# Patient Record
Sex: FEMALE | Race: BLACK OR AFRICAN AMERICAN | NOT HISPANIC OR LATINO | Employment: FULL TIME | URBAN - METROPOLITAN AREA
[De-identification: names, ages, dates, MRNs, and addresses within clinical notes are randomized per-mention and may not be internally consistent; named-entity substitution may affect disease eponyms.]

---

## 2020-08-19 PROCEDURE — 99284 EMERGENCY DEPT VISIT MOD MDM: CPT

## 2020-08-20 ENCOUNTER — APPOINTMENT (OUTPATIENT)
Dept: ULTRASOUND IMAGING | Facility: HOSPITAL | Age: 25
End: 2020-08-20

## 2020-08-20 ENCOUNTER — HOSPITAL ENCOUNTER (EMERGENCY)
Facility: HOSPITAL | Age: 25
Discharge: HOME | End: 2020-08-20
Attending: EMERGENCY MEDICINE | Admitting: EMERGENCY MEDICINE

## 2020-08-20 VITALS
RESPIRATION RATE: 16 BRPM | HEIGHT: 62 IN | OXYGEN SATURATION: 99 % | TEMPERATURE: 98.9 F | HEART RATE: 64 BPM | SYSTOLIC BLOOD PRESSURE: 102 MMHG | DIASTOLIC BLOOD PRESSURE: 68 MMHG

## 2020-08-20 DIAGNOSIS — O20.0 THREATENED ABORTION IN EARLY PREGNANCY: ICD-10-CM

## 2020-08-20 DIAGNOSIS — N93.9 VAGINAL BLEEDING: Primary | ICD-10-CM

## 2020-08-20 LAB
ABO GROUP BLD: NORMAL
ALBUMIN SERPL-MCNC: 3.8 G/DL (ref 3.5–5.2)
ALBUMIN/GLOB SERPL: 1.2 G/DL
ALP SERPL-CCNC: 66 U/L (ref 39–117)
ALT SERPL W P-5'-P-CCNC: 30 U/L (ref 1–33)
ANION GAP SERPL CALCULATED.3IONS-SCNC: 5.7 MMOL/L (ref 5–15)
AST SERPL-CCNC: 24 U/L (ref 1–32)
BACTERIA UR QL AUTO: NORMAL /HPF
BASOPHILS # BLD AUTO: 0.01 10*3/MM3 (ref 0–0.2)
BASOPHILS NFR BLD AUTO: 0.2 % (ref 0–1.5)
BILIRUB SERPL-MCNC: 0.3 MG/DL (ref 0–1.2)
BILIRUB UR QL STRIP: NEGATIVE
BUN SERPL-MCNC: 9 MG/DL (ref 6–20)
BUN/CREAT SERPL: 14.5 (ref 7–25)
CALCIUM SPEC-SCNC: 8.7 MG/DL (ref 8.6–10.5)
CHLORIDE SERPL-SCNC: 106 MMOL/L (ref 98–107)
CLARITY UR: CLEAR
CO2 SERPL-SCNC: 25.3 MMOL/L (ref 22–29)
COLOR UR: YELLOW
CREAT SERPL-MCNC: 0.62 MG/DL (ref 0.57–1)
DEPRECATED RDW RBC AUTO: 44.5 FL (ref 37–54)
EOSINOPHIL # BLD AUTO: 0.17 10*3/MM3 (ref 0–0.4)
EOSINOPHIL NFR BLD AUTO: 3.4 % (ref 0.3–6.2)
ERYTHROCYTE [DISTWIDTH] IN BLOOD BY AUTOMATED COUNT: 14.7 % (ref 12.3–15.4)
GFR SERPL CREATININE-BSD FRML MDRD: 143 ML/MIN/1.73
GLOBULIN UR ELPH-MCNC: 3.1 GM/DL
GLUCOSE SERPL-MCNC: 93 MG/DL (ref 65–99)
GLUCOSE UR STRIP-MCNC: NEGATIVE MG/DL
HCG INTACT+B SERPL-ACNC: 6.37 MIU/ML
HCG SERPL QL: POSITIVE
HCT VFR BLD AUTO: 37.6 % (ref 34–46.6)
HGB BLD-MCNC: 12.5 G/DL (ref 12–15.9)
HGB UR QL STRIP.AUTO: ABNORMAL
HYALINE CASTS UR QL AUTO: NORMAL /LPF
IMM GRANULOCYTES # BLD AUTO: 0.01 10*3/MM3 (ref 0–0.05)
IMM GRANULOCYTES NFR BLD AUTO: 0.2 % (ref 0–0.5)
KETONES UR QL STRIP: NEGATIVE
LEUKOCYTE ESTERASE UR QL STRIP.AUTO: NEGATIVE
LYMPHOCYTES # BLD AUTO: 1.93 10*3/MM3 (ref 0.7–3.1)
LYMPHOCYTES NFR BLD AUTO: 39 % (ref 19.6–45.3)
MCH RBC QN AUTO: 27.7 PG (ref 26.6–33)
MCHC RBC AUTO-ENTMCNC: 33.2 G/DL (ref 31.5–35.7)
MCV RBC AUTO: 83.2 FL (ref 79–97)
MONOCYTES # BLD AUTO: 0.28 10*3/MM3 (ref 0.1–0.9)
MONOCYTES NFR BLD AUTO: 5.7 % (ref 5–12)
NEUTROPHILS NFR BLD AUTO: 2.55 10*3/MM3 (ref 1.7–7)
NEUTROPHILS NFR BLD AUTO: 51.5 % (ref 42.7–76)
NITRITE UR QL STRIP: NEGATIVE
NRBC BLD AUTO-RTO: 0 /100 WBC (ref 0–0.2)
PH UR STRIP.AUTO: >=9 [PH] (ref 5–8)
PLATELET # BLD AUTO: 216 10*3/MM3 (ref 140–450)
PMV BLD AUTO: 11.4 FL (ref 6–12)
POTASSIUM SERPL-SCNC: 3.4 MMOL/L (ref 3.5–5.2)
PROT SERPL-MCNC: 6.9 G/DL (ref 6–8.5)
PROT UR QL STRIP: NEGATIVE
RBC # BLD AUTO: 4.52 10*6/MM3 (ref 3.77–5.28)
RBC # UR: NORMAL /HPF
REF LAB TEST METHOD: NORMAL
RH BLD: POSITIVE
SODIUM SERPL-SCNC: 137 MMOL/L (ref 136–145)
SP GR UR STRIP: 1.02 (ref 1–1.03)
SQUAMOUS #/AREA URNS HPF: NORMAL /HPF
UROBILINOGEN UR QL STRIP: ABNORMAL
WBC # BLD AUTO: 4.95 10*3/MM3 (ref 3.4–10.8)
WBC UR QL AUTO: NORMAL /HPF

## 2020-08-20 PROCEDURE — 76815 OB US LIMITED FETUS(S): CPT

## 2020-08-20 PROCEDURE — 93976 VASCULAR STUDY: CPT

## 2020-08-20 PROCEDURE — 86900 BLOOD TYPING SEROLOGIC ABO: CPT | Performed by: PHYSICIAN ASSISTANT

## 2020-08-20 PROCEDURE — 85025 COMPLETE CBC W/AUTO DIFF WBC: CPT | Performed by: EMERGENCY MEDICINE

## 2020-08-20 PROCEDURE — 84702 CHORIONIC GONADOTROPIN TEST: CPT | Performed by: PHYSICIAN ASSISTANT

## 2020-08-20 PROCEDURE — 86901 BLOOD TYPING SEROLOGIC RH(D): CPT | Performed by: PHYSICIAN ASSISTANT

## 2020-08-20 PROCEDURE — 80053 COMPREHEN METABOLIC PANEL: CPT | Performed by: EMERGENCY MEDICINE

## 2020-08-20 PROCEDURE — 81001 URINALYSIS AUTO W/SCOPE: CPT | Performed by: EMERGENCY MEDICINE

## 2020-08-20 PROCEDURE — 76817 TRANSVAGINAL US OBSTETRIC: CPT

## 2020-08-20 PROCEDURE — 84703 CHORIONIC GONADOTROPIN ASSAY: CPT | Performed by: EMERGENCY MEDICINE

## 2020-08-20 RX ORDER — ACETAMINOPHEN 500 MG
1000 TABLET ORAL ONCE
Status: COMPLETED | OUTPATIENT
Start: 2020-08-20 | End: 2020-08-20

## 2020-08-20 RX ORDER — DOXYCYCLINE HYCLATE 50 MG/1
324 CAPSULE, GELATIN COATED ORAL 3 TIMES DAILY
COMMUNITY
Start: 2015-01-09

## 2020-08-20 RX ADMIN — ACETAMINOPHEN 1000 MG: 500 TABLET, FILM COATED ORAL at 03:01

## 2020-08-20 NOTE — ED PROVIDER NOTES
EMERGENCY DEPARTMENT ENCOUNTER    Room Number:  06/06  Date of encounter:  8/20/2020  PCP: Provider, No Known  Historian: Patient      HPI:  Chief Complaint: Vaginal bleeding  A complete HPI/ROS/PMH/PSH/SH/FH are unobtainable due to: Nothing    Context: Freda Cantrell is a 25 y.o. female who presents to the ED c/o vaginal bleeding that began earlier today.  Patient states it is more of a spotting like with her menstrual cycle and she was due to start today however she recently had 3 home positive pregnancy test and is concerned that she may be pregnant and bleeding.  She denies associated fever, chills, nausea, vomiting, recent sick contacts at this time.  She also denies abdominal pain, abnormal bowel movements.  She states there is some lower care being in her back that she describes as mild and controlled with Tylenol.  She states the cramping is nonradiating and tends to be worse with movement.  Patient is unsure of her blood type.  There are no other issues to report at this time.      PAST MEDICAL HISTORY  Active Ambulatory Problems     Diagnosis Date Noted   • No Active Ambulatory Problems     Resolved Ambulatory Problems     Diagnosis Date Noted   • No Resolved Ambulatory Problems     Past Medical History:   Diagnosis Date   • Anemia          PAST SURGICAL HISTORY  History reviewed. No pertinent surgical history.      FAMILY HISTORY  History reviewed. No pertinent family history.      SOCIAL HISTORY  Social History     Socioeconomic History   • Marital status: Single     Spouse name: Not on file   • Number of children: Not on file   • Years of education: Not on file   • Highest education level: Not on file   Tobacco Use   • Smoking status: Never Smoker   • Smokeless tobacco: Never Used   Substance and Sexual Activity   • Alcohol use: Not Currently   • Drug use: Not Currently   • Sexual activity: Yes     Partners: Male     Birth control/protection: None         ALLERGIES  Patient has no known  allergies.        REVIEW OF SYSTEMS  Review of Systems   Constitutional: Negative for chills.   HENT: Negative.    Respiratory: Negative.    Cardiovascular: Negative.    Gastrointestinal: Negative.    Genitourinary: Positive for vaginal bleeding. Negative for flank pain.   Musculoskeletal: Positive for back pain.   Skin: Negative.    Neurological: Negative.  Weakness:    Hematological: Does not bruise/bleed easily.   Psychiatric/Behavioral: Negative.         All systems reviewed and negative except for those discussed in HPI.       PHYSICAL EXAM    I have reviewed the triage vital signs and nursing notes.    ED Triage Vitals [08/19/20 2249]   Temp Heart Rate Resp BP SpO2   98.9 °F (37.2 °C) 88 16 108/71 100 %      Temp src Heart Rate Source Patient Position BP Location FiO2 (%)   Tympanic Monitor Sitting Left arm --       Physical Exam  GENERAL: WDWN female no acute distress  HENT: nares patent NCAT  EYES: no scleral icterus  CV: regular rhythm, regular rate, heart sounds normal, no pedal edema  RESPIRATORY: normal effort no adventitious lung sounds  ABDOMEN: soft, nontender  MUSCULOSKELETAL: no deformity  NEURO: alert, moves all extremities, follows commands, no focal neuro deficits  SKIN: warm, dry no obvious skin rash to the areas of concern        LAB RESULTS  Recent Results (from the past 24 hour(s))   Comprehensive Metabolic Panel    Collection Time: 08/20/20  1:59 AM   Result Value Ref Range    Glucose 93 65 - 99 mg/dL    BUN 9 6 - 20 mg/dL    Creatinine 0.62 0.57 - 1.00 mg/dL    Sodium 137 136 - 145 mmol/L    Potassium 3.4 (L) 3.5 - 5.2 mmol/L    Chloride 106 98 - 107 mmol/L    CO2 25.3 22.0 - 29.0 mmol/L    Calcium 8.7 8.6 - 10.5 mg/dL    Total Protein 6.9 6.0 - 8.5 g/dL    Albumin 3.80 3.50 - 5.20 g/dL    ALT (SGPT) 30 1 - 33 U/L    AST (SGOT) 24 1 - 32 U/L    Alkaline Phosphatase 66 39 - 117 U/L    Total Bilirubin 0.3 0.0 - 1.2 mg/dL    eGFR  African Amer 143 >60 mL/min/1.73    Globulin 3.1 gm/dL    A/G  Ratio 1.2 g/dL    BUN/Creatinine Ratio 14.5 7.0 - 25.0    Anion Gap 5.7 5.0 - 15.0 mmol/L   hCG, Serum, Qualitative    Collection Time: 08/20/20  1:59 AM   Result Value Ref Range    HCG Qualitative Positive (A) Negative   CBC Auto Differential    Collection Time: 08/20/20  1:59 AM   Result Value Ref Range    WBC 4.95 3.40 - 10.80 10*3/mm3    RBC 4.52 3.77 - 5.28 10*6/mm3    Hemoglobin 12.5 12.0 - 15.9 g/dL    Hematocrit 37.6 34.0 - 46.6 %    MCV 83.2 79.0 - 97.0 fL    MCH 27.7 26.6 - 33.0 pg    MCHC 33.2 31.5 - 35.7 g/dL    RDW 14.7 12.3 - 15.4 %    RDW-SD 44.5 37.0 - 54.0 fl    MPV 11.4 6.0 - 12.0 fL    Platelets 216 140 - 450 10*3/mm3    Neutrophil % 51.5 42.7 - 76.0 %    Lymphocyte % 39.0 19.6 - 45.3 %    Monocyte % 5.7 5.0 - 12.0 %    Eosinophil % 3.4 0.3 - 6.2 %    Basophil % 0.2 0.0 - 1.5 %    Immature Grans % 0.2 0.0 - 0.5 %    Neutrophils, Absolute 2.55 1.70 - 7.00 10*3/mm3    Lymphocytes, Absolute 1.93 0.70 - 3.10 10*3/mm3    Monocytes, Absolute 0.28 0.10 - 0.90 10*3/mm3    Eosinophils, Absolute 0.17 0.00 - 0.40 10*3/mm3    Basophils, Absolute 0.01 0.00 - 0.20 10*3/mm3    Immature Grans, Absolute 0.01 0.00 - 0.05 10*3/mm3    nRBC 0.0 0.0 - 0.2 /100 WBC   Urinalysis With Microscopic If Indicated (No Culture) - Urine, Clean Catch    Collection Time: 08/20/20  1:59 AM   Result Value Ref Range    Color, UA Yellow Yellow, Straw    Appearance, UA Clear Clear    pH, UA >=9.0 (H) 5.0 - 8.0    Specific Gravity, UA 1.018 1.005 - 1.030    Glucose, UA Negative Negative    Ketones, UA Negative Negative    Bilirubin, UA Negative Negative    Blood, UA Moderate (2+) (A) Negative    Protein, UA Negative Negative    Leuk Esterase, UA Negative Negative    Nitrite, UA Negative Negative    Urobilinogen, UA 1.0 E.U./dL 0.2 - 1.0 E.U./dL   Urinalysis, Microscopic Only - Urine, Clean Catch    Collection Time: 08/20/20  1:59 AM   Result Value Ref Range    RBC, UA 0-2 None Seen, 0-2 /HPF    WBC, UA 0-2 None Seen, 0-2 /HPF     Bacteria, UA None Seen None Seen /HPF    Squamous Epithelial Cells, UA 0-2 None Seen, 0-2 /HPF    Hyaline Casts, UA None Seen None Seen /LPF    Methodology Automated Microscopy    hCG, Quantitative, Pregnancy    Collection Time: 08/20/20  3:01 AM   Result Value Ref Range    HCG Quantitative 6.37 mIU/mL   ABO / Rh    Collection Time: 08/20/20  3:01 AM   Result Value Ref Range    ABO Type A     RH type Positive        Ordered the above labs and independently reviewed the results.        RADIOLOGY  No Radiology Exams Resulted Within Past 24 Hours    I ordered the above noted radiological studies. Reviewed by me and discussed with radiologist.  See dictation for official radiology interpretation.      PROCEDURES    Procedures      MEDICATIONS GIVEN IN ER    Medications   acetaminophen (TYLENOL) tablet 1,000 mg (1,000 mg Oral Given 8/20/20 0301)         PROGRESS, DATA ANALYSIS, CONSULTS, AND MEDICAL DECISION MAKING    All labs have been independently reviewed by me.  All radiology studies have been reviewed by me and discussed with radiologist dictating the report.   EKG's independently viewed and interpreted by me.  Discussion below represents my analysis of pertinent findings related to patient's condition, differential diagnosis, treatment plan and final disposition.      Suspect likely miscarriage given history and work-up.  However, cannot rule out an extremely early pregnancy.  We will have the patient follow-up with OB/GYN next 48 hours or as soon as possible to get a repeat quantitative hCG and further evaluation.  Have her return to the emergency department with any worsening of her vaginal bleeding.  Patient appears very stable, nontoxic, and in no pain at this time.  Will DC home at this time.      ED Course as of Aug 20 0443   Thu Aug 20, 2020   0341 HCG Quantitative: 6.37 [RC]   0342 HCG Qualitative(!): Positive [RC]   0342 WBC: 4.95 [RC]   0342 Hemoglobin: 12.5 [RC]   0342 RBC: 4.52 [RC]   0342 Hematocrit:  37.6 [RC]   0342 Platelets: 216 [RC]   0342 Glucose: 93 [RC]   0342 Creatinine: 0.62 [RC]   0342 BUN: 9 [RC]   0342 Potassium(!): 3.4 [RC]   0342 Anion Gap: 5.7 [RC]   0403 ABO Type: A [RC]   0403 RH type: Positive [RC]   0403 HCG Quantitative: 6.37 [RC]      ED Course User Index  [RC] Carlos Bustamante III, PA       Patient was placed in face mask in first look. Patient was wearing facemask when I entered the room and throughout our encounter. I wore full protective equipment throughout this patient encounter including a face mask, and gloves. Hand hygiene was performed before donning protective equipment and after removal when leaving the room.    AS OF 04:43 VITALS:    BP - 109/63  HR - 88  TEMP - 98.9 °F (37.2 °C) (Tympanic)  O2 SATS - 100%        DIAGNOSIS  Final diagnoses:   Vaginal bleeding   Threatened  in early pregnancy         DISPOSITION  DISCHARGE    Patient discharged in stable condition.    Reviewed implications of results, diagnosis, meds, responsibility to follow up, warning signs and symptoms of possible worsening, potential complications and reasons to return to ER.    Patient/Family voiced understanding of above instructions.    Discussed plan for discharge, as there is no emergent indication for admission. Patient referred to primary care provider for BP management due to today's BP. Pt/family is agreeable and understands need for follow up and repeat testing.  Pt is aware that discharge does not mean that nothing is wrong but it indicates no emergency is present that requires admission and they must continue care with follow-up as given below or physician of their choice.     FOLLOW-UP  Ester Sanders MD  [address] [place] [address] [address]  [place] [address]  [phone]    Schedule an appointment as soon as possible for a visit   For further evaluation and treatment         Medication List      No changes were made to your prescriptions during this visit.                   Carlos Bustamante III, PA  08/20/20 0445

## 2020-08-20 NOTE — ED PROVIDER NOTES
The CINTIA and I have discussed this patients history, physical exam, and treatment plan. I have reviewed the documentation and personally had a face to face interaction with the patient. I affirm the documentation and agree with the treatment and plan.  The following note describes my personal findings    This patient is a 25-year-old female presenting to the emergency room [facility] with vaginal bleeding.  The patient states that this is approximately around the time where her period should be beginning but took a home pregnancy test today that was positive.  The patient is well describes lower abdominal cramping with associated symptoms.    Exam: This patient is resting comfortably and in no distress, without gross neurological deficits.  She is afebrile with stable vital signs and nontoxic in appearance.  Abdominal exam shows a soft abdomen with mild tenderness to palpation to the suprapubic region.  There is no rebound and no guarding.    Plan: Her pregnancy test is positive however her quantitative hCG is less than 7.  Ultrasound shows no gestational sac and no obvious IUP.  At this point we will have her follow-up with OB/GYN for repeat quantitative testing.      The patient was wearing a facemask upon entrance into the room and remained in such throughout their visit.  I was wearing PPE including a facemask, eye protection, as well as gloves at any point entering the room and throughout the visit     Shakir Darden MD  08/20/20 0912

## 2020-08-20 NOTE — ED TRIAGE NOTES
"Pt to ED from home via PV c/o vaginal bleeding, light/spotty. Denies pain. States she believes she may be pregnant. Pt states \"I'm not really late yet, I was supposed to start today. I took a pregnancy test at home though and it said positive so I just want to get checked out.\" Pt alert, answering questions appropriately, ambulating unassisted in triage. Pt masked, triage staff wearing appropriate PPE.    Pt noted to have hx anemia. Takes PO iron.   "

## 2020-08-20 NOTE — DISCHARGE INSTRUCTIONS
Follow-up with OB/GYN above in the next 48 hours or on Monday if we run into the weekend for repeat hCG and further evaluation.    Return to the emergency department should your bleeding increase to a pad an hour for period of greater than 6 hours or should you become lightheaded and dizzy associated with the bleeding.

## 2020-08-24 ENCOUNTER — OFFICE VISIT (OUTPATIENT)
Dept: OBSTETRICS AND GYNECOLOGY | Facility: CLINIC | Age: 25
End: 2020-08-24

## 2020-08-24 VITALS
HEART RATE: 64 BPM | HEIGHT: 62 IN | BODY MASS INDEX: 35.7 KG/M2 | DIASTOLIC BLOOD PRESSURE: 72 MMHG | WEIGHT: 194 LBS | SYSTOLIC BLOOD PRESSURE: 104 MMHG

## 2020-08-24 DIAGNOSIS — O36.80X0 PREGNANCY OF UNKNOWN ANATOMIC LOCATION: Primary | ICD-10-CM

## 2020-08-24 DIAGNOSIS — N91.2 AMENORRHEA: ICD-10-CM

## 2020-08-24 LAB
B-HCG UR QL: NEGATIVE
INTERNAL NEGATIVE CONTROL: NEGATIVE
INTERNAL POSITIVE CONTROL: POSITIVE
Lab: NORMAL

## 2020-08-24 PROCEDURE — 99204 OFFICE O/P NEW MOD 45 MIN: CPT | Performed by: STUDENT IN AN ORGANIZED HEALTH CARE EDUCATION/TRAINING PROGRAM

## 2020-08-24 PROCEDURE — 81025 URINE PREGNANCY TEST: CPT | Performed by: STUDENT IN AN ORGANIZED HEALTH CARE EDUCATION/TRAINING PROGRAM

## 2020-08-24 NOTE — PROGRESS NOTES
Chief Complaint   Patient presents with   • Gynecologic Exam     fu from ER-possible loss over weekend        SUBJECTIVE:     Freda Cantrell is a 25 y.o.  who presents for follow up of possible SAB. The patient reports that she presented to [facility] ED 4 days ago for vaginal bleeding in pregnancy. She states the bleeding was spotting like at the end of her period and she was concerned because she took four pregnancy tests at home, which confirmed she was pregnant. Her last menstrual period started on 20. During her ED visit, she underwent an ultrasound that demonstrated no intrauterine pregnancy, no free fluid in the pelvis, 2 uterine fibroids, and right ovary not visualized. Her HCG was 6.37. Blood type A+. She was discharged home with follow up appointment today. The patient reports two days following ED visit that she passed a large blood clot with tissue into the toilet. She had some light bleeding following the episode but today reports no further bleeding. She has no additional symptoms today and denies lower abdominal cramping, fever, chills, nausea, vomiting, light headedness, dizziness. She thinks she had a miscarriage.     The patient reports history of regular menses every 28-30 days, lasting 5-6 days with moderate dysmenorrhea.     Past Medical History:   Diagnosis Date   • Anemia       History reviewed. No pertinent surgical history.   Social History     Tobacco Use   • Smoking status: Never Smoker   • Smokeless tobacco: Current User   Substance Use Topics   • Alcohol use: Not Currently   • Drug use: Not Currently     OB History    Para Term  AB Living   2       1     SAB TAB Ectopic Molar Multiple Live Births     1              # Outcome Date GA Lbr Amadeo/2nd Weight Sex Delivery Anes PTL Lv   2             1 TAB                 Review of Systems   Constitutional: Negative for chills and fever.   HENT: Negative for congestion and sore throat.    Eyes: Negative.    Respiratory:  "Negative for cough and shortness of breath.    Cardiovascular: Negative for chest pain and leg swelling.   Gastrointestinal: Negative for abdominal pain, nausea and vomiting.   Endocrine: Negative.    Genitourinary: Positive for vaginal bleeding. Negative for difficulty urinating, dysuria, menstrual problem and vaginal discharge.   Musculoskeletal: Negative.    Skin: Negative for rash and wound.   Allergic/Immunologic: Negative.    Neurological: Negative for dizziness and light-headedness.   Hematological: Negative.    Psychiatric/Behavioral: Negative.        OBJECTIVE:   Vitals:    20 1337   BP: 104/72   Pulse: 64   Weight: 88 kg (194 lb)   Height: 157.5 cm (62\")        Physical Exam   Constitutional: She is oriented to person, place, and time. She appears well-developed and well-nourished.   HENT:   Head: Normocephalic and atraumatic.   Right Ear: External ear normal.   Left Ear: External ear normal.   Eyes: Conjunctivae and EOM are normal.   Neck: Normal range of motion. Neck supple.   Cardiovascular: Normal rate and regular rhythm.   Pulmonary/Chest: Effort normal. No respiratory distress.   Abdominal: Soft. She exhibits no distension and no mass. There is no tenderness. There is no rebound and no guarding.   Genitourinary:   Genitourinary Comments: Normal external female genitalia, well estrogenized vaginal mucosa, normal appearing cervix without lesions, no active bleeding. Uterus normal sized, non-tender, mobile. No adnexal masses or tenderness.    Musculoskeletal: Normal range of motion. She exhibits no edema or deformity.   Neurological: She is alert and oriented to person, place, and time.   Skin: Skin is warm and dry.   Psychiatric: She has a normal mood and affect. Her behavior is normal.       ASSESSMENT:   Pregnancy of unknown location, likely SAB     PLAN:   We discussed that based on the patient's history she most likely experienced a spontaneous . However, since she had a positive HCG " with a quant of 6 and no identifiable IUP, recommended trending HCG until negative. Ordered HCG quant today. If negative, no further labs or workup required. We discussed that a miscarriage happens approximately 20% of the time in a woman her age and that 50% of the time, it is due to chromosomal abnormalities. We reviewed that no further workup is indicated if this is a SAB and she may attempt pregnancy when she has a period. Patient indicated understanding and all questions/concerns answered. Will notify patient of HCG quant results and next steps based on lab.     See below for orders    Orders Placed This Encounter   Procedures   • NuSwab VG+ - Swab, Vagina   • HCG, B-subunit, Quantitative   • POC Pregnancy, Urine      Return in about 1 month (around 9/24/2020) for Annual physical.    Erika Ni MD  8/26/2020  11:53

## 2020-08-25 ENCOUNTER — TELEPHONE (OUTPATIENT)
Dept: OBSTETRICS AND GYNECOLOGY | Facility: CLINIC | Age: 25
End: 2020-08-25

## 2020-08-25 LAB — HCG INTACT+B SERPL-ACNC: <1 MIU/ML

## 2020-08-25 NOTE — TELEPHONE ENCOUNTER
Called patient to discuss results. Verified name and . Reviewed negative serum HCG results indicating no further follow up. Patient indicated understanding and all questions/concerns answered.    Erika Ni MD  2020  14:16

## 2020-08-26 PROBLEM — O03.9 SAB (SPONTANEOUS ABORTION): Status: ACTIVE | Noted: 2020-08-25

## 2020-08-26 PROBLEM — O36.80X0 PREGNANCY OF UNKNOWN ANATOMIC LOCATION: Status: ACTIVE | Noted: 2020-08-24

## 2020-08-26 PROBLEM — O36.80X0 PREGNANCY OF UNKNOWN ANATOMIC LOCATION: Status: RESOLVED | Noted: 2020-08-24 | Resolved: 2020-08-26

## 2020-08-29 LAB
A VAGINAE DNA VAG QL NAA+PROBE: ABNORMAL SCORE
BVAB2 DNA VAG QL NAA+PROBE: ABNORMAL SCORE
C ALBICANS DNA VAG QL NAA+PROBE: NEGATIVE
C GLABRATA DNA VAG QL NAA+PROBE: NEGATIVE
C TRACH DNA VAG QL NAA+PROBE: NEGATIVE
MEGA1 DNA VAG QL NAA+PROBE: ABNORMAL SCORE
N GONORRHOEA DNA VAG QL NAA+PROBE: NEGATIVE
T VAGINALIS DNA VAG QL NAA+PROBE: NEGATIVE

## 2020-09-02 ENCOUNTER — TELEPHONE (OUTPATIENT)
Dept: OBSTETRICS AND GYNECOLOGY | Facility: CLINIC | Age: 25
End: 2020-09-02

## 2020-09-02 DIAGNOSIS — N76.0 BV (BACTERIAL VAGINOSIS): Primary | ICD-10-CM

## 2020-09-02 DIAGNOSIS — B96.89 BV (BACTERIAL VAGINOSIS): Primary | ICD-10-CM

## 2020-09-02 RX ORDER — METRONIDAZOLE 500 MG/1
500 TABLET ORAL 2 TIMES DAILY
Qty: 14 TABLET | Refills: 0 | Status: SHIPPED | OUTPATIENT
Start: 2020-09-02 | End: 2020-09-09

## 2020-09-02 NOTE — TELEPHONE ENCOUNTER
Called patient regarding results of NuSwab. Verified name and . We discussed that the patient had bacterial vaginosis and that treatment is flagyl 500 mg BID x 7 days. Prescription sent to patient's pharmacy. All questions and concerns answered.    Erika Ni MD  2020  14:43